# Patient Record
Sex: MALE | Race: WHITE
[De-identification: names, ages, dates, MRNs, and addresses within clinical notes are randomized per-mention and may not be internally consistent; named-entity substitution may affect disease eponyms.]

---

## 2019-04-22 ENCOUNTER — HOSPITAL ENCOUNTER (OUTPATIENT)
Dept: HOSPITAL 61 - US | Age: 24
Discharge: HOME | End: 2019-04-22
Attending: FAMILY MEDICINE
Payer: COMMERCIAL

## 2019-04-22 DIAGNOSIS — K42.9: Primary | ICD-10-CM

## 2019-04-22 PROCEDURE — 76705 ECHO EXAM OF ABDOMEN: CPT

## 2019-04-22 NOTE — RAD
Limited abdominal ultrasound without comparison for bleeding umbilicus, 

possible umbilical hernia.

 

TECHNIQUE AND FINDINGS: Real-time grayscale imaging of the area of 

interest is performed. There is no mass, fluid collation, or other 

discernible abnormality in in the periumbilical soft tissues. 

Specifically, there is no hernia.

 

IMPRESSION:

1. No sonographically discernible periumbilical abnormality.

 

Electronically signed by: Ignacio Mares MD (4/22/2019 3:28 PM) [id number]